# Patient Record
Sex: FEMALE | Race: WHITE | URBAN - METROPOLITAN AREA
[De-identification: names, ages, dates, MRNs, and addresses within clinical notes are randomized per-mention and may not be internally consistent; named-entity substitution may affect disease eponyms.]

---

## 2020-08-13 ENCOUNTER — OFFICE VISIT (OUTPATIENT)
Dept: UROGYNECOLOGY | Age: 70
End: 2020-08-13
Payer: MEDICARE

## 2020-08-13 VITALS
RESPIRATION RATE: 16 BRPM | OXYGEN SATURATION: 98 % | DIASTOLIC BLOOD PRESSURE: 88 MMHG | HEART RATE: 69 BPM | SYSTOLIC BLOOD PRESSURE: 168 MMHG | TEMPERATURE: 96.7 F

## 2020-08-13 PROBLEM — K80.20 CHOLELITHIASIS: Status: ACTIVE | Noted: 2017-01-01

## 2020-08-13 PROBLEM — E55.9 VITAMIN D DEFICIENCY: Status: ACTIVE | Noted: 2019-10-20

## 2020-08-13 PROBLEM — K21.9 GERD (GASTROESOPHAGEAL REFLUX DISEASE): Status: ACTIVE | Noted: 2020-08-13

## 2020-08-13 PROBLEM — Z80.3 FAMILY HISTORY OF BREAST CANCER: Status: ACTIVE | Noted: 2018-06-06

## 2020-08-13 PROBLEM — M51.34 DDD (DEGENERATIVE DISC DISEASE), THORACIC: Status: ACTIVE | Noted: 2017-01-01

## 2020-08-13 PROBLEM — M54.40 CHRONIC MIDLINE LOW BACK PAIN WITH SCIATICA: Status: ACTIVE | Noted: 2017-08-10

## 2020-08-13 PROBLEM — G89.29 CHRONIC MIDLINE LOW BACK PAIN WITH SCIATICA: Status: ACTIVE | Noted: 2017-08-10

## 2020-08-13 PROBLEM — M41.9 SCOLIOSIS: Status: ACTIVE | Noted: 2017-01-01

## 2020-08-13 PROBLEM — I10 ESSENTIAL HYPERTENSION: Status: ACTIVE | Noted: 2020-02-16

## 2020-08-13 PROBLEM — K58.0 IRRITABLE BOWEL SYNDROME WITH DIARRHEA: Status: ACTIVE | Noted: 2017-08-10

## 2020-08-13 PROBLEM — F51.01 PRIMARY INSOMNIA: Status: ACTIVE | Noted: 2017-08-10

## 2020-08-13 PROBLEM — M48.061 LUMBAR SPINAL STENOSIS: Status: ACTIVE | Noted: 2017-01-30

## 2020-08-13 PROBLEM — D12.6 ADENOMATOUS POLYP OF COLON: Status: ACTIVE | Noted: 2020-08-13

## 2020-08-13 PROBLEM — M43.16 SPONDYLOLISTHESIS OF LUMBAR REGION: Status: ACTIVE | Noted: 2017-01-01

## 2020-08-13 PROBLEM — E66.811 CLASS 1 OBESITY WITH SERIOUS COMORBIDITY AND BODY MASS INDEX (BMI) OF 32.0 TO 32.9 IN ADULT: Status: ACTIVE | Noted: 2019-10-20

## 2020-08-13 PROBLEM — E73.9 LACTOSE INTOLERANCE: Status: ACTIVE | Noted: 2020-08-13

## 2020-08-13 PROBLEM — E66.9 CLASS 1 OBESITY WITH SERIOUS COMORBIDITY AND BODY MASS INDEX (BMI) OF 32.0 TO 32.9 IN ADULT: Status: ACTIVE | Noted: 2019-10-20

## 2020-08-13 PROCEDURE — 99214 OFFICE O/P EST MOD 30 MIN: CPT | Performed by: OBSTETRICS & GYNECOLOGY

## 2020-08-13 RX ORDER — NITROFURANTOIN 25; 75 MG/1; MG/1
100 CAPSULE ORAL 2 TIMES DAILY
Qty: 6 CAPSULE | Refills: 0 | Status: SHIPPED | OUTPATIENT
Start: 2020-08-13 | End: 2020-08-18

## 2020-08-13 RX ORDER — PHENAZOPYRIDINE HYDROCHLORIDE 100 MG/1
100 TABLET, FILM COATED ORAL
Qty: 12 TABLET | Refills: 0 | Status: SHIPPED | OUTPATIENT
Start: 2020-08-13

## 2020-08-13 RX ORDER — PHENAZOPYRIDINE HYDROCHLORIDE 100 MG/1
100 TABLET, FILM COATED ORAL
COMMUNITY
Start: 2018-10-24 | End: 2020-08-13 | Stop reason: SDUPTHER

## 2020-08-13 ASSESSMENT — ENCOUNTER SYMPTOMS
BACK PAIN: 1
EYE ITCHING: 1
EYE REDNESS: 1
DIARRHEA: 1
SINUS PRESSURE: 1
ABDOMINAL PAIN: 1

## 2020-08-13 NOTE — PROGRESS NOTES
Social History:   Social History     Socioeconomic History    Marital status:      Spouse name: Not on file    Number of children: Not on file    Years of education: Not on file    Highest education level: Not on file   Occupational History    Not on file   Social Needs    Financial resource strain: Not on file    Food insecurity     Worry: Not on file     Inability: Not on file    Transportation needs     Medical: Not on file     Non-medical: Not on file   Tobacco Use    Smoking status: Unknown If Ever Smoked   Substance and Sexual Activity    Alcohol use: Not on file    Drug use: Not on file    Sexual activity: Not on file   Lifestyle    Physical activity     Days per week: Not on file     Minutes per session: Not on file    Stress: Not on file   Relationships    Social connections     Talks on phone: Not on file     Gets together: Not on file     Attends Cheondoism service: Not on file     Active member of club or organization: Not on file     Attends meetings of clubs or organizations: Not on file     Relationship status: Not on file    Intimate partner violence     Fear of current or ex partner: Not on file     Emotionally abused: Not on file     Physically abused: Not on file     Forced sexual activity: Not on file   Other Topics Concern    Not on file   Social History Narrative    Not on file     Family History: No family history on file. Review of System   Review of Systems   HENT: Positive for sinus pressure and sneezing. Eyes: Positive for redness and itching. Gastrointestinal: Positive for abdominal pain and diarrhea. Musculoskeletal: Positive for arthralgias, back pain and myalgias. Allergic/Immunologic: Positive for food allergies. Neurological: Positive for headaches.        Objective:     Vitals  Vitals:    08/13/20 1405   BP: (!) 168/88   Pulse: 69   Resp: 16   Temp: 96.7 °F (35.9 °C)   SpO2: 98%     Physical Exam  Physical Exam  HENT:      Head: Normocephalic

## 2020-08-13 NOTE — LETTER
[address]  [address] [facility]  Phone: [phone]  Fax: [phone]    Estee Morejon MD        August 13, 2020     Asif Quintero, [address] [place] [address]    Patient: Damian Diaz   MR Number: <[id number]>   YOB: 1950   Date of Visit: 8/13/2020       Dear Dr. Reyes : Thank you for referring Damian Diaz to me for evaluation. Below are the relevant portions of my assessment and plan of care. If you have questions, please do not hesitate to call me. I look forward to following Ely along with you.     Sincerely,        Estee Morejon MD

## 2021-06-11 ENCOUNTER — TELEPHONE (OUTPATIENT)
Dept: UROGYNECOLOGY | Age: 71
End: 2021-06-11

## 2021-06-11 NOTE — TELEPHONE ENCOUNTER
Pt called feeling like she has a UTI, called her back and she reported taking 2 of the self start macrobid. I advised her to continue taking for a few days and if she wasn't feeling any better by Monday to give us a call back, and to take the pyridium for symptoms. She was agreeable.

## 2021-06-14 ENCOUNTER — TELEPHONE (OUTPATIENT)
Dept: UROGYNECOLOGY | Age: 71
End: 2021-06-14

## 2021-06-14 RX ORDER — NITROFURANTOIN 25; 75 MG/1; MG/1
100 CAPSULE ORAL 2 TIMES DAILY
Qty: 8 CAPSULE | Refills: 0 | Status: SHIPPED | OUTPATIENT
Start: 2021-06-14 | End: 2021-06-18

## 2021-06-14 NOTE — TELEPHONE ENCOUNTER
Pt called again reporting that she is starting to feel better, but its not quite gone. Per CD ok to send in another 4 days of macrobid to complete a full therapy. Called pt and let her know. She was thankful .

## 2021-11-01 ENCOUNTER — OFFICE VISIT (OUTPATIENT)
Dept: UROGYNECOLOGY | Age: 71
End: 2021-11-01
Payer: MEDICARE

## 2021-11-01 VITALS
OXYGEN SATURATION: 96 % | DIASTOLIC BLOOD PRESSURE: 113 MMHG | SYSTOLIC BLOOD PRESSURE: 164 MMHG | TEMPERATURE: 97.2 F | RESPIRATION RATE: 18 BRPM | HEART RATE: 89 BPM

## 2021-11-01 DIAGNOSIS — R39.15 URGENCY OF URINATION: Primary | ICD-10-CM

## 2021-11-01 DIAGNOSIS — R30.0 DYSURIA: ICD-10-CM

## 2021-11-01 DIAGNOSIS — R35.0 URINARY FREQUENCY: ICD-10-CM

## 2021-11-01 LAB
BILIRUBIN, POC: NORMAL
BLOOD URINE, POC: NORMAL
CLARITY, POC: CLEAR
COLOR, POC: YELLOW
GLUCOSE URINE, POC: NORMAL
KETONES, POC: NORMAL
LEUKOCYTE EST, POC: NORMAL
NITRITE, POC: NORMAL
PH, POC: 7
PROTEIN, POC: NORMAL
SPECIFIC GRAVITY, POC: 1.01
UROBILINOGEN, POC: NORMAL

## 2021-11-01 PROCEDURE — 81002 URINALYSIS NONAUTO W/O SCOPE: CPT | Performed by: NURSE PRACTITIONER

## 2021-11-01 PROCEDURE — 99213 OFFICE O/P EST LOW 20 MIN: CPT | Performed by: NURSE PRACTITIONER

## 2021-11-01 PROCEDURE — 51701 INSERT BLADDER CATHETER: CPT | Performed by: NURSE PRACTITIONER

## 2021-11-01 RX ORDER — PROPRANOLOL HYDROCHLORIDE 40 MG/1
TABLET ORAL
COMMUNITY
Start: 2021-08-24

## 2021-11-01 RX ORDER — ZOLPIDEM TARTRATE 5 MG/1
TABLET ORAL
COMMUNITY
Start: 2021-10-21

## 2021-11-01 RX ORDER — PHENAZOPYRIDINE HYDROCHLORIDE 100 MG/1
100 TABLET, FILM COATED ORAL 3 TIMES DAILY PRN
Qty: 9 TABLET | Refills: 0 | Status: SHIPPED | OUTPATIENT
Start: 2021-11-01 | End: 2021-11-10

## 2021-11-01 RX ORDER — NITROFURANTOIN 25; 75 MG/1; MG/1
100 CAPSULE ORAL 2 TIMES DAILY
Qty: 20 CAPSULE | Refills: 1 | Status: SHIPPED | OUTPATIENT
Start: 2021-11-01

## 2021-11-01 RX ORDER — CELECOXIB 200 MG/1
CAPSULE ORAL
COMMUNITY
Start: 2021-10-02

## 2021-11-01 RX ORDER — SULFAMETHOXAZOLE AND TRIMETHOPRIM 800; 160 MG/1; MG/1
TABLET ORAL
COMMUNITY
Start: 2021-10-22

## 2021-11-01 ASSESSMENT — ENCOUNTER SYMPTOMS: DIARRHEA: 1

## 2021-11-01 NOTE — PROGRESS NOTES
11/1/2021       HPI:     Name: Cameron Brown  YOB: 1950    CC: Patient is a 80 y.o. presenting for evaluation of pelvic pain, voiding dysfunction and UTI.   HPI: How long have you had this problem? A few weeks  Please rate the severity of your problem: moderate  Anything make it better? The antibiotics help but states that her symptoms have not gone away completely. Bambi was given 2 doses of self start Macrobid over 1 year ago. She has used both. Last week she went to [facility] clinic with s/s of UTI:  Given Bactrim  Culture showed to me on her phone:  <10,000 gram negative. She is having burning at urethra. No other symptoms  She does use Compounded Estrogen cream twice weekly as directed    Ob/Gyn History:    OB History   No obstetric history on file. Past Medical History:   Past Medical History:   Diagnosis Date    Acute cystitis without hematuria     Adenomatous polyp of colon     Allergic rhinitis     Cholelithiasis     Chronic midline low back pain     Colon polyp     DDD (degenerative disc disease), thoracic     Dysuria     Essential hypertension     Fibromyalgia     GERD (gastroesophageal reflux disease)     Hyperlipidemia     Insomnia     Irritable bowel syndrome with diarrhea     Lactose intolerance     Lumbar spinal stenosis     Migraine headache     OA (osteoarthritis)     Scoliosis     Spondylolisthesis     Vaginal atrophy     Vitamin D deficiency      Past Surgical History: History reviewed. No pertinent surgical history. Allergies: Allergies   Allergen Reactions    Iodides Hives     Other reaction(s): Unknown    Rofecoxib Swelling     swelling      Statins      Other reaction(s):  Other - comment required  Leg cramps  Leg cramps      Duloxetine Other (See Comments)     Night sweats    Methocarbamol Nausea And Vomiting    Propoxyphene Other (See Comments) and Nausea And Vomiting     Other reaction(s): GI Upset  GI distress       Current Medications:  Current Outpatient Medications   Medication Sig Dispense Refill    celecoxib (CELEBREX) 200 MG capsule       Cholecalciferol 50 MCG (2000 UT) CAPS Take by mouth daily      halobetasol (ULTRAVATE) 0.05 % cream       propranolol (INDERAL) 40 MG tablet       zolpidem (AMBIEN) 5 MG tablet       phenazopyridine (PYRIDIUM) 100 MG tablet Take 1 tablet by mouth 3 times daily as needed for Pain 9 tablet 0    nitrofurantoin, macrocrystal-monohydrate, (MACROBID) 100 MG capsule Take 1 capsule by mouth 2 times daily 20 capsule 1    ESTRADIOL [address] vaginally      phenazopyridine (PYRIDIUM) 100 MG tablet Take 1 tablet by mouth 3 times daily (with meals) 12 tablet 0    sulfamethoxazole-trimethoprim (BACTRIM DS;SEPTRA DS) 800-160 MG per tablet  (Patient not taking: Reported on 11/1/2021)       No current facility-administered medications for this visit. Social History:   Social History     Socioeconomic History    Marital status:      Spouse name: Not on file    Number of children: Not on file    Years of education: Not on file    Highest education level: Not on file   Occupational History    Not on file   Tobacco Use    Smoking status: Never Smoker    Smokeless tobacco: Never Used   Vaping Use    Vaping Use: Never used   Substance and Sexual Activity    Alcohol use: Never    Drug use: Never    Sexual activity: Not Currently     Partners: Male   Other Topics Concern    Not on file   Social History Narrative    Not on file     Social Determinants of Health     Financial Resource Strain:     Difficulty of Paying Living Expenses:    Food Insecurity:     Worried About Running Out of Food in the Last Year:     [address] in the Last Year:    Transportation Needs:     Lack of Transportation (Medical):      Lack of Transportation (Non-Medical):    Physical Activity:     Days of Exercise per Week:     Minutes of Exercise per Session:    Stress:     Feeling of Stress :    Social Connections:     Frequency of Communication with Friends and Family:     Frequency of Social Gatherings with Friends and Family:     Attends Buddhism Services:     Active Member of Clubs or Organizations:     Attends Club or Organization Meetings:     Marital Status:    Intimate Partner Violence:     Fear of Current or Ex-Partner:     Emotionally Abused:     Physically Abused:     Sexually Abused:      Family History: History reviewed. No pertinent family history. Review of System   Review of Systems   Gastrointestinal: Positive for diarrhea (IBS). Genitourinary: Positive for frequency. Musculoskeletal: Positive for arthralgias. Allergic/Immunologic: Positive for environmental allergies and food allergies. Neurological: Positive for headaches (Migraines). All other systems reviewed and are negative. A review of systems was done by the patient and reviewed by me and scanned into media today. Objective:     Vitals  Vitals:    11/01/21 1358   BP: (!) 164/113   Pulse: 89   Resp: 18   Temp: 97.2 °F (36.2 °C)   SpO2: 96%     Physical Exam  Physical Exam  Vitals and nursing note reviewed. Constitutional:       Appearance: Normal appearance. HENT:      Head: Normocephalic. Eyes:      Conjunctiva/sclera: Conjunctivae normal.   Cardiovascular:      Rate and Rhythm: Normal rate. Pulmonary:      Effort: Pulmonary effort is normal.   Musculoskeletal:         General: Normal range of motion. Cervical back: Normal range of motion. Skin:     General: Skin is warm and dry. Neurological:      General: No focal deficit present. Mental Status: She is alert and oriented to person, place, and time. Psychiatric:         Mood and Affect: Mood normal.         Behavior: Behavior normal.         Thought Content:  Thought content normal.         Results for POC orders placed in visit on 11/01/21   POCT Urinalysis no Micro   Result Value Ref Range    Color, UA Yellow     Clarity, UA Clear Glucose, UA POC Neg     Bilirubin, UA Neg     Ketones, UA Neg     Spec Grav, UA 1.015     Blood, UA POC Neg     pH, UA 7.0     Protein, UA POC Neg     Urobilinogen, UA Neg     Leukocytes, UA Neg     Nitrite, UA Neg        Assessment/Plan:     Slava Riggs is a 80 y.o. female with   1. Urgency of urination    2. Urinary frequency    3. Dysuria      -records reviewed. H/O recurrent UTI:  -Due to symptoms of UTI, urine sent for culture, await results for any further treatment  -Vaginal atrophy: reviewed instructions for use of estrogen cream.  Order faxed to [facility] for refills  Pyridium sent for comfort. Instructions for use reviewed. ABILIO North CNP      Orders Placed This Encounter   Procedures    Culture, Urine     Order Specific Question:   Specify (ex-cath, midstream, cysto, etc)?      Answer:   Straight Cath    POCT Urinalysis no Micro    AL INSERT,NON-INDWELLING BLADDER CATHETER     Orders Placed This Encounter   Medications    phenazopyridine (PYRIDIUM) 100 MG tablet     Sig: Take 1 tablet by mouth 3 times daily as needed for Pain     Dispense:  9 tablet     Refill:  0    nitrofurantoin, macrocrystal-monohydrate, (MACROBID) 100 MG capsule     Sig: Take 1 capsule by mouth 2 times daily     Dispense:  20 capsule     Refill:  [address] [employer], APRN - CNP

## 2021-11-03 ENCOUNTER — TELEPHONE (OUTPATIENT)
Dept: UROGYNECOLOGY | Age: 71
End: 2021-11-03

## 2021-11-03 NOTE — TELEPHONE ENCOUNTER
[facility] faxed increased refill request. Form scanned and placed on physician's desk for signature.

## 2021-11-04 ENCOUNTER — TELEPHONE (OUTPATIENT)
Dept: UROGYNECOLOGY | Age: 71
End: 2021-11-04

## 2021-11-04 LAB — URINE CULTURE, ROUTINE: NORMAL

## 2023-01-11 ENCOUNTER — OFFICE VISIT (OUTPATIENT)
Dept: UROGYNECOLOGY | Age: 73
End: 2023-01-11
Payer: MEDICARE

## 2023-01-11 VITALS
TEMPERATURE: 98 F | DIASTOLIC BLOOD PRESSURE: 84 MMHG | RESPIRATION RATE: 16 BRPM | HEART RATE: 62 BPM | OXYGEN SATURATION: 99 % | SYSTOLIC BLOOD PRESSURE: 139 MMHG

## 2023-01-11 DIAGNOSIS — Z87.440 HISTORY OF UTI: Primary | ICD-10-CM

## 2023-01-11 DIAGNOSIS — N95.2 VAGINAL ATROPHY: ICD-10-CM

## 2023-01-11 PROCEDURE — G8400 PT W/DXA NO RESULTS DOC: HCPCS | Performed by: NURSE PRACTITIONER

## 2023-01-11 PROCEDURE — 3079F DIAST BP 80-89 MM HG: CPT | Performed by: NURSE PRACTITIONER

## 2023-01-11 PROCEDURE — G8427 DOCREV CUR MEDS BY ELIG CLIN: HCPCS | Performed by: NURSE PRACTITIONER

## 2023-01-11 PROCEDURE — 3075F SYST BP GE 130 - 139MM HG: CPT | Performed by: NURSE PRACTITIONER

## 2023-01-11 PROCEDURE — 99213 OFFICE O/P EST LOW 20 MIN: CPT | Performed by: NURSE PRACTITIONER

## 2023-01-11 PROCEDURE — 3017F COLORECTAL CA SCREEN DOC REV: CPT | Performed by: NURSE PRACTITIONER

## 2023-01-11 PROCEDURE — 1036F TOBACCO NON-USER: CPT | Performed by: NURSE PRACTITIONER

## 2023-01-11 PROCEDURE — 1090F PRES/ABSN URINE INCON ASSESS: CPT | Performed by: NURSE PRACTITIONER

## 2023-01-11 PROCEDURE — G8484 FLU IMMUNIZE NO ADMIN: HCPCS | Performed by: NURSE PRACTITIONER

## 2023-01-11 PROCEDURE — 1123F ACP DISCUSS/DSCN MKR DOCD: CPT | Performed by: NURSE PRACTITIONER

## 2023-01-11 PROCEDURE — G8421 BMI NOT CALCULATED: HCPCS | Performed by: NURSE PRACTITIONER

## 2023-01-11 NOTE — PROGRESS NOTES
1/11/2023       HPI:     Name: Sylvie Nolasco  YOB: 1950    CC: Patient is a 71 y.o. presenting for evaluation of Recurrent UTI's. Needs refill on estrogen cream and has been over a year since visit. HPI: How long have you had this problem? years  Please rate the severity of your problem: moderate  Anything make it better? PMH:  No changes  PSH: No changes. She is generally doing well. Presents for estrogen cream refill      Was using self start Macrobid and Estrogen cream, last visit on 11/1/2021. Will need refill on Estrogen cream sent to [facility]. On Bactrim currently for possible UTI, ruling out kidney stones with her PCP - testing to be done. She reports she has had back pain and hematuria. She has appointment with Urology 1/12/23    Ob/Gyn History:    OB History   No obstetric history on file. Past Medical History:   Past Medical History:   Diagnosis Date    Acute cystitis without hematuria     Adenomatous polyp of colon     Allergic rhinitis     Cholelithiasis     Chronic midline low back pain     Colon polyp     DDD (degenerative disc disease), thoracic     Dysuria     Essential hypertension     Fibromyalgia     GERD (gastroesophageal reflux disease)     Hyperlipidemia     Insomnia     Irritable bowel syndrome with diarrhea     Lactose intolerance     Lumbar spinal stenosis     Migraine headache     OA (osteoarthritis)     Scoliosis     Spondylolisthesis     Vaginal atrophy     Vitamin D deficiency      Past Surgical History: History reviewed. No pertinent surgical history. Allergies: Allergies   Allergen Reactions    Prednisone Swelling    Iodides Hives     Other reaction(s): Unknown    Rofecoxib Swelling     swelling      Statins      Other reaction(s):  Other - comment required  Leg cramps  Leg cramps      Duloxetine Other (See Comments)     Night sweats    Methocarbamol Nausea And Vomiting    Propoxyphene Other (See Comments) and Nausea And Vomiting     Other reaction(s): GI Upset  GI distress       Current Medications:  Current Outpatient Medications   Medication Sig Dispense Refill    celecoxib (CELEBREX) 200 MG capsule       Cholecalciferol 50 MCG (2000 UT) CAPS Take by mouth daily      propranolol (INDERAL) 40 MG tablet       sulfamethoxazole-trimethoprim (BACTRIM DS;SEPTRA DS) 800-160 MG per tablet       ESTRADIOL [address] vaginally      halobetasol (ULTRAVATE) 0.05 % cream  (Patient not taking: Reported on 1/11/2023)      zolpidem (AMBIEN) 5 MG tablet  (Patient not taking: Reported on 1/11/2023)      nitrofurantoin, macrocrystal-monohydrate, (MACROBID) 100 MG capsule Take 1 capsule by mouth 2 times daily (Patient not taking: Reported on 1/11/2023) 20 capsule 1    phenazopyridine (PYRIDIUM) 100 MG tablet Take 1 tablet by mouth 3 times daily (with meals) (Patient not taking: Reported on 1/11/2023) 12 tablet 0     No current facility-administered medications for this visit. Social History:   Social History     Socioeconomic History    Marital status:      Spouse name: Not on file    Number of children: Not on file    Years of education: Not on file    Highest education level: Not on file   Occupational History    Not on file   Tobacco Use    Smoking status: Never    Smokeless tobacco: Never   Vaping Use    Vaping Use: Never used   Substance and Sexual Activity    Alcohol use: Never    Drug use: Never    Sexual activity: Not Currently     Partners: Male   Other Topics Concern    Not on file   Social History Narrative    Not on file     Social Determinants of Health     Financial Resource Strain: Not on file   Food Insecurity: Not on file   Transportation Needs: Not on file   Physical Activity: Not on file   Stress: Not on file   Social Connections: Not on file   Intimate Partner Violence: Not on file   Housing Stability: Not on file     Family History: History reviewed. No pertinent family history.       Objective:     Vitals  Vitals:    01/11/23 1346   BP: 139/84   Pulse: 62   Resp: 16   Temp: 98 °F (36.7 °C)   SpO2: 99%     Physical Exam  Physical Exam  Vitals and nursing note reviewed. Constitutional:       Appearance: Normal appearance. HENT:      Head: Normocephalic. Eyes:      Conjunctiva/sclera: Conjunctivae normal.   Cardiovascular:      Rate and Rhythm: Normal rate. Pulmonary:      Effort: Pulmonary effort is normal.   Musculoskeletal:         General: Normal range of motion. Cervical back: Normal range of motion. Skin:     General: Skin is warm and dry. Neurological:      General: No focal deficit present. Mental Status: She is alert. Psychiatric:         Mood and Affect: Mood normal.         Behavior: Behavior normal.       No results found for this visit on 01/11/23. Assessment/Plan:     Eddie Gresham is a 71 y.o. female with   1. History of UTI    2. Vaginal atrophy    -Records reviewed  -Hx of UTI:  She has done well with her estrogen and self start macrobid in the past.  She desires refill of her estrogen cream from [facility]. We reviewed instructions for topical use 2-3 times weekly    She is currently being seen by Urology for possible Kidney stones. Taking Bactrim for current UTI per their instruction    F/U yearly or prn   ABILIO Chiang - CNP      No orders of the defined types were placed in this encounter. No orders of the defined types were placed in this encounter.       ABILIO Chiang - CNP

## 2024-01-12 ENCOUNTER — OFFICE VISIT (OUTPATIENT)
Dept: UROGYNECOLOGY | Age: 74
End: 2024-01-12

## 2024-01-12 VITALS
DIASTOLIC BLOOD PRESSURE: 87 MMHG | OXYGEN SATURATION: 96 % | RESPIRATION RATE: 16 BRPM | SYSTOLIC BLOOD PRESSURE: 137 MMHG | TEMPERATURE: 97.2 F | HEART RATE: 82 BPM

## 2024-01-12 DIAGNOSIS — N39.0 RECURRENT UTI: Primary | ICD-10-CM

## 2024-01-12 DIAGNOSIS — N95.2 VAGINAL ATROPHY: ICD-10-CM

## 2024-01-12 RX ORDER — BACLOFEN 10 MG/1
TABLET ORAL
COMMUNITY
Start: 2023-11-21

## 2024-01-12 RX ORDER — GABAPENTIN 100 MG/1
2 CAPSULE ORAL 2 TIMES DAILY
COMMUNITY
Start: 2023-09-08

## 2024-01-12 RX ORDER — CYCLOBENZAPRINE HCL 5 MG
TABLET ORAL
COMMUNITY
Start: 2023-09-08

## 2024-01-12 NOTE — PROGRESS NOTES
1/12/2024       HPI:     Name: Ely Childers  YOB: 1950    CC: Patient is a 73 y.o. presenting for evaluation of  history of UTI's and Vaginal Atrophy .     HPI: How long have you had this problem?  years  Please rate the severity of your problem: moderate  Anything make it better? Per patient no recent UTI's, using Estrogen cream and in need for refills    PMH:  pinched nerve in back, receiving steroids and doing exercises  PSH: no changes    She has done well with no UTIs  Admits she has not used estrogen in the past month, just forgot        Ob/Gyn History:    OB History   No obstetric history on file.     Past Medical History:   Past Medical History:   Diagnosis Date    Acute cystitis without hematuria     Adenomatous polyp of colon     Allergic rhinitis     Cholelithiasis     Chronic midline low back pain     Colon polyp     DDD (degenerative disc disease), thoracic     Dysuria     Essential hypertension     Fibromyalgia     GERD (gastroesophageal reflux disease)     Hyperlipidemia     Insomnia     Irritable bowel syndrome with diarrhea     Lactose intolerance     Lumbar spinal stenosis     Migraine headache     OA (osteoarthritis)     Scoliosis     Spondylolisthesis     Vaginal atrophy     Vitamin D deficiency      Past Surgical History: History reviewed. No pertinent surgical history.  Allergies:   Allergies   Allergen Reactions    Prednisone Swelling    Iodides Hives     Other reaction(s): Unknown    Rofecoxib Swelling     swelling      Statins      Other reaction(s): Other - comment required  Leg cramps  Leg cramps      Duloxetine Other (See Comments)     Night sweats    Methocarbamol Nausea And Vomiting    Propoxyphene Other (See Comments) and Nausea And Vomiting     Other reaction(s): GI Upset  GI distress       Current Medications:  Current Outpatient Medications   Medication Sig Dispense Refill    gabapentin (NEURONTIN) 100 MG capsule Take 2 capsules by mouth 2 times daily.

## 2024-01-15 ENCOUNTER — TELEPHONE (OUTPATIENT)
Dept: UROGYNECOLOGY | Age: 74
End: 2024-01-15

## 2024-01-15 NOTE — TELEPHONE ENCOUNTER
Patient called office and states she was seen in office by DANYELLE Siegel on Friday in which she requested refills of vaginal estrogen cream, but [facility] has not received the order.     Per DANYELLE Siegel's last note: \"Will continue topical estrogen, call when refills needed.\"    Order faxed to [facility] at this time.     Patient denies any further questions or concerns. Thankful for the call.

## 2025-08-27 ENCOUNTER — TELEPHONE (OUTPATIENT)
Dept: UROGYNECOLOGY | Age: 75
End: 2025-08-27

## 2025-09-03 ENCOUNTER — OFFICE VISIT (OUTPATIENT)
Dept: UROGYNECOLOGY | Age: 75
End: 2025-09-03

## 2025-09-03 VITALS
RESPIRATION RATE: 16 BRPM | TEMPERATURE: 97.1 F | DIASTOLIC BLOOD PRESSURE: 93 MMHG | SYSTOLIC BLOOD PRESSURE: 157 MMHG | HEART RATE: 83 BPM | OXYGEN SATURATION: 100 %

## 2025-09-03 DIAGNOSIS — N95.2 VAGINAL ATROPHY: Primary | ICD-10-CM
